# Patient Record
Sex: FEMALE | Race: OTHER | URBAN - METROPOLITAN AREA
[De-identification: names, ages, dates, MRNs, and addresses within clinical notes are randomized per-mention and may not be internally consistent; named-entity substitution may affect disease eponyms.]

---

## 2017-01-16 ENCOUNTER — OFFICE VISIT (OUTPATIENT)
Dept: INTERNAL MEDICINE CLINIC | Facility: CLINIC | Age: 62
End: 2017-01-16

## 2017-01-16 ENCOUNTER — TELEPHONE (OUTPATIENT)
Dept: INTERNAL MEDICINE CLINIC | Facility: CLINIC | Age: 62
End: 2017-01-16

## 2017-01-16 VITALS
HEART RATE: 86 BPM | OXYGEN SATURATION: 95 % | DIASTOLIC BLOOD PRESSURE: 70 MMHG | RESPIRATION RATE: 12 BRPM | HEIGHT: 63 IN | TEMPERATURE: 99 F | WEIGHT: 150 LBS | SYSTOLIC BLOOD PRESSURE: 129 MMHG | BODY MASS INDEX: 26.58 KG/M2

## 2017-01-16 DIAGNOSIS — J45.901 EXACERBATION OF ASTHMA: Primary | ICD-10-CM

## 2017-01-16 PROCEDURE — 99212 OFFICE O/P EST SF 10 MIN: CPT | Performed by: INTERNAL MEDICINE

## 2017-01-16 PROCEDURE — 99214 OFFICE O/P EST MOD 30 MIN: CPT | Performed by: INTERNAL MEDICINE

## 2017-01-16 RX ORDER — PREDNISONE 10 MG/1
TABLET ORAL
Qty: 15 TABLET | Refills: 0 | Status: SHIPPED | OUTPATIENT
Start: 2017-01-16

## 2017-01-16 RX ORDER — CHOLECALCIFEROL (VITAMIN D3) 125 MCG
2 CAPSULE ORAL DAILY
COMMUNITY

## 2017-01-16 RX ORDER — ALBUTEROL SULFATE 90 UG/1
2 AEROSOL, METERED RESPIRATORY (INHALATION) EVERY 6 HOURS PRN
Qty: 1 INHALER | Refills: 1 | Status: SHIPPED | OUTPATIENT
Start: 2017-01-16 | End: 2017-12-13

## 2017-01-16 RX ORDER — ATORVASTATIN CALCIUM 10 MG/1
TABLET, FILM COATED ORAL
Qty: 90 TABLET | Refills: 0 | Status: SHIPPED | OUTPATIENT
Start: 2017-01-16 | End: 2017-04-12

## 2017-01-16 NOTE — PROGRESS NOTES
HPI:    Patient ID: Christos Mckoy is a 63year old female. Cough  This is a new problem. The current episode started 1 to 4 weeks ago. The problem has been waxing and waning. The cough is productive of sputum and productive of purulent sputum.  Assoc appear ill. No distress. HENT:   Right Ear: External ear normal.   Left Ear: External ear normal.   Nose: Nose normal.   Mouth/Throat: Oropharynx is clear and moist. No oropharyngeal exudate.    Eyes: Conjunctivae are normal. Pupils are equal, round, and

## 2017-01-16 NOTE — TELEPHONE ENCOUNTER
Patient stated for 2 to 3 days has had a cough producing dark yellow phlegm, headache, stuffy nose, little chest congestion with wheezing due to her asthma. Stated has shortness of breath at time but is relieved with her inhaler.    Appointment made for to

## 2017-04-12 DIAGNOSIS — E78.00 PURE HYPERCHOLESTEROLEMIA: Primary | ICD-10-CM

## 2017-04-14 NOTE — TELEPHONE ENCOUNTER
LMTCB    Please transfer x [phone] Rodo Beckett RN) until 5pm today or [phone] anytime. Thank you.

## 2017-04-15 NOTE — TELEPHONE ENCOUNTER
Called and spoke with pt states she is still taking this medication. Hasn't has lipid panel since 2015 did you want us to order that?

## 2017-04-19 RX ORDER — ATORVASTATIN CALCIUM 10 MG/1
TABLET, FILM COATED ORAL
Qty: 90 TABLET | Refills: 0 | Status: SHIPPED | OUTPATIENT
Start: 2017-04-19 | End: 2018-01-22

## 2017-04-19 NOTE — TELEPHONE ENCOUNTER
Yes she needs to get lipid and cmp; this needs to be done since we need to monitor cholesterol and particularly her liver when taking chol med.

## 2017-07-28 ENCOUNTER — TELEPHONE (OUTPATIENT)
Dept: INTERNAL MEDICINE CLINIC | Facility: CLINIC | Age: 62
End: 2017-07-28

## 2017-07-28 NOTE — TELEPHONE ENCOUNTER
Actions Requested: office visit  Problem: sore throat/strep exposure  Onset and Timing: 3 days  Associated Symptoms: sore throat, earache, red swollen tonsils, cough  Aggravating by: n/a  Alleviated by: n/a  Triage Note: Patient states her 28year old son (especially chest and abdomen)    Care Advice Discussed:  * Reasons To Call Back      - You become worse

## 2017-07-29 ENCOUNTER — OFFICE VISIT (OUTPATIENT)
Dept: INTERNAL MEDICINE CLINIC | Facility: CLINIC | Age: 62
End: 2017-07-29

## 2017-07-29 VITALS
WEIGHT: 150 LBS | RESPIRATION RATE: 12 BRPM | BODY MASS INDEX: 26.58 KG/M2 | HEART RATE: 71 BPM | SYSTOLIC BLOOD PRESSURE: 100 MMHG | TEMPERATURE: 98 F | HEIGHT: 63 IN | DIASTOLIC BLOOD PRESSURE: 61 MMHG

## 2017-07-29 DIAGNOSIS — J02.9 SORE THROAT: Primary | ICD-10-CM

## 2017-07-29 LAB
CONTROL LINE PRESENT WITH A CLEAR BACKGROUND (YES/NO): YES YES/NO
KIT LOT #: NORMAL NUMERIC
STREP GRP A CUL-SCR: NEGATIVE

## 2017-07-29 PROCEDURE — 99214 OFFICE O/P EST MOD 30 MIN: CPT | Performed by: INTERNAL MEDICINE

## 2017-07-29 PROCEDURE — 99213 OFFICE O/P EST LOW 20 MIN: CPT | Performed by: INTERNAL MEDICINE

## 2017-07-29 PROCEDURE — 87880 STREP A ASSAY W/OPTIC: CPT | Performed by: INTERNAL MEDICINE

## 2017-07-29 NOTE — PROGRESS NOTES
HPI:    Patient ID: Linda Ho is a 63year old female. Sore Throat    This is a new problem. The current episode started in the past 7 days. The problem has been unchanged. There has been no fever.  Associated symptoms include coughing, ear pain exhibits no discharge. No scleral icterus. Neck: Normal range of motion. Neck supple. No thyromegaly present. Cardiovascular: Normal rate, regular rhythm and normal heart sounds. No murmur heard.   Pulmonary/Chest: Effort normal and breath sounds nor

## 2017-08-17 RX ORDER — ATORVASTATIN CALCIUM 10 MG/1
TABLET, FILM COATED ORAL
Qty: 90 TABLET | Refills: 0 | OUTPATIENT
Start: 2017-08-17

## 2017-08-17 NOTE — TELEPHONE ENCOUNTER
Failed protocol no recent labs  Cholesterol Medications  Protocol Criteria:  · Appointment scheduled in the past 12 months or in the next 3 months  · ALT & LDL on file in the past 12 months  · ALT result < 80  · LDL result <130   Recent Outpatient Visits

## 2017-12-05 NOTE — TELEPHONE ENCOUNTER
Pt would like refill on Rx Atorvastatin 10 MG.   Also updated pharmacy   Pt stts out of medication     Current Outpatient Prescriptions:  ATORVASTATIN CALCIUM 10 MG Oral Tab TAKE 1 TABLET BY MOUTH AT BEDTIME Disp: 90 tablet Rfl: 0

## 2017-12-05 NOTE — TELEPHONE ENCOUNTER
No recent labs  Last refilled on 4/19/17 #90 0RF Compliance? Has appt in 1 week.    Cholesterol Medications  Protocol Criteria:  · Appointment scheduled in the past 12 months or in the next 3 months  · ALT & LDL on file in the past 12 months  · ALT result

## 2017-12-07 RX ORDER — ATORVASTATIN CALCIUM 10 MG/1
10 TABLET, FILM COATED ORAL
Qty: 90 TABLET | Refills: 0 | OUTPATIENT
Start: 2017-12-07

## 2017-12-07 NOTE — TELEPHONE ENCOUNTER
Patient indicated that has an appointment with Dr Beal Brought on 12/13/17.      Refill refused: Have patient call the office (pt has had no lipid panel and cmp for 2 yrs; needs ov)

## 2017-12-13 ENCOUNTER — OFFICE VISIT (OUTPATIENT)
Dept: INTERNAL MEDICINE CLINIC | Facility: CLINIC | Age: 62
End: 2017-12-13

## 2017-12-13 VITALS
HEART RATE: 65 BPM | HEIGHT: 63 IN | WEIGHT: 145 LBS | RESPIRATION RATE: 12 BRPM | SYSTOLIC BLOOD PRESSURE: 113 MMHG | DIASTOLIC BLOOD PRESSURE: 66 MMHG | OXYGEN SATURATION: 97 % | BODY MASS INDEX: 25.69 KG/M2 | TEMPERATURE: 97 F

## 2017-12-13 DIAGNOSIS — J45.21 MILD INTERMITTENT ASTHMA WITH EXACERBATION: Primary | ICD-10-CM

## 2017-12-13 DIAGNOSIS — Z23 NEED FOR VACCINATION: ICD-10-CM

## 2017-12-13 DIAGNOSIS — E78.00 HYPERCHOLESTEREMIA: ICD-10-CM

## 2017-12-13 DIAGNOSIS — Z12.11 COLON CANCER SCREENING: ICD-10-CM

## 2017-12-13 PROCEDURE — 90686 IIV4 VACC NO PRSV 0.5 ML IM: CPT | Performed by: INTERNAL MEDICINE

## 2017-12-13 PROCEDURE — 90471 IMMUNIZATION ADMIN: CPT | Performed by: INTERNAL MEDICINE

## 2017-12-13 PROCEDURE — 99212 OFFICE O/P EST SF 10 MIN: CPT | Performed by: INTERNAL MEDICINE

## 2017-12-13 PROCEDURE — 99214 OFFICE O/P EST MOD 30 MIN: CPT | Performed by: INTERNAL MEDICINE

## 2017-12-13 RX ORDER — ALBUTEROL SULFATE 90 UG/1
2 AEROSOL, METERED RESPIRATORY (INHALATION) EVERY 6 HOURS PRN
Qty: 1 INHALER | Refills: 1 | Status: SHIPPED | OUTPATIENT
Start: 2017-12-13 | End: 2018-04-30

## 2017-12-13 NOTE — PROGRESS NOTES
HPI:    Patient ID: Wiley Blizzard is a 63year old female. Asthma   This is a chronic problem. The current episode started more than 1 month ago (2mos had been getting worse since moving to another home). The problem occurs constantly.  The problem h Allergies   PHYSICAL EXAM:   Physical Exam   Constitutional: She is oriented to person, place, and time. She appears well-developed and well-nourished. No distress. HENT:   Head: Normocephalic and atraumatic.    Right Ear: External ear normal.   Left Ear: before will refill her chol med. Ff low chol diet.     (Z23) Need for vaccination  Plan: FLULAVAL INFLUENZA VACCINE QUAD PRESERVATIVE         FREE 0.5 ML        Pt given flu shot today.    (Z12.11) Colon cancer screening  Plan: OCCULT BLOOD, FECAL, IMMUNOAS

## 2018-01-14 LAB
ALBUMIN/GLOBULIN RATIO: 1.3 (CALC) (ref 1–2.5)
ALBUMIN: 4.1 G/DL (ref 3.6–5.1)
ALKALINE PHOSPHATASE: 82 U/L (ref 33–130)
ALT: 26 U/L (ref 6–29)
AST: 16 U/L (ref 10–35)
BILIRUBIN, TOTAL: 0.3 MG/DL (ref 0.2–1.2)
BUN: 18 MG/DL (ref 7–25)
CALCIUM: 9.8 MG/DL (ref 8.6–10.4)
CARBON DIOXIDE: 23 MMOL/L (ref 20–31)
CHLORIDE: 106 MMOL/L (ref 98–110)
CHOL/HDLC RATIO: 5 (CALC)
CHOLESTEROL, TOTAL: 224 MG/DL
CREATININE: 0.86 MG/DL (ref 0.5–0.99)
EGFR IF AFRICN AM: 85 ML/MIN/1.73M2
EGFR IF NONAFRICN AM: 73 ML/MIN/1.73M2
GLOBULIN: 3.2 G/DL (CALC) (ref 1.9–3.7)
GLUCOSE: 97 MG/DL (ref 65–99)
HDL CHOLESTEROL: 45 MG/DL
LDL-CHOLESTEROL: 153 MG/DL (CALC)
NON-HDL CHOLESTEROL: 179 MG/DL (CALC)
POTASSIUM: 4.6 MMOL/L (ref 3.5–5.3)
PROTEIN, TOTAL: 7.3 G/DL (ref 6.1–8.1)
SODIUM: 139 MMOL/L (ref 135–146)
TRIGLYCERIDES: 132 MG/DL

## 2018-01-22 ENCOUNTER — TELEPHONE (OUTPATIENT)
Dept: INTERNAL MEDICINE CLINIC | Facility: CLINIC | Age: 63
End: 2018-01-22

## 2018-01-22 DIAGNOSIS — E78.5 ELEVATED LIPIDS: Primary | ICD-10-CM

## 2018-01-22 RX ORDER — ATORVASTATIN CALCIUM 10 MG/1
10 TABLET, FILM COATED ORAL NIGHTLY
Qty: 90 TABLET | Refills: 0 | Status: SHIPPED | OUTPATIENT
Start: 2018-01-22 | End: 2018-04-30

## 2018-05-01 RX ORDER — ATORVASTATIN CALCIUM 10 MG/1
TABLET, FILM COATED ORAL
Qty: 90 TABLET | Refills: 0 | Status: SHIPPED | OUTPATIENT
Start: 2018-05-01 | End: 2018-07-31

## 2018-07-31 RX ORDER — ATORVASTATIN CALCIUM 10 MG/1
TABLET, FILM COATED ORAL
Qty: 90 TABLET | Refills: 0 | Status: SHIPPED | OUTPATIENT
Start: 2018-07-31 | End: 2018-11-05

## 2018-11-05 RX ORDER — ATORVASTATIN CALCIUM 10 MG/1
TABLET, FILM COATED ORAL
Qty: 90 TABLET | Refills: 0 | Status: SHIPPED | OUTPATIENT
Start: 2018-11-05

## 2019-05-03 ENCOUNTER — HOSPITAL (OUTPATIENT)
Dept: OTHER | Age: 64
End: 2019-05-03
Attending: FAMILY MEDICINE

## 2019-11-08 DIAGNOSIS — E78.00 HYPERCHOLESTEREMIA: Primary | ICD-10-CM

## 2019-11-08 RX ORDER — ATORVASTATIN CALCIUM 10 MG/1
TABLET, FILM COATED ORAL
Qty: 90 TABLET | Refills: 0 | OUTPATIENT
Start: 2019-11-08

## 2019-11-08 NOTE — TELEPHONE ENCOUNTER
Does not pass protocol. Overdue for labs and appt. Called the patient. She states she has moved to [facility] and now has a new PCP. She will call her new PCP for refill. Refill refused per protocol, no longer under prescriber care.      Cholesterol Med

## 2020-03-24 RX ORDER — ATORVASTATIN CALCIUM 10 MG/1
TABLET, FILM COATED ORAL
Qty: 90 TABLET | Refills: 0 | OUTPATIENT
Start: 2020-03-24

## 2020-03-24 NOTE — TELEPHONE ENCOUNTER
Spoke with pt informed  Dr. Eric Cosme denied chol med as pt has not had labs done in 2 yrs. Pt stts she actually had wellness labs done with her employer via [employer] a few wks ago. I informed pt to have those labs faxed to us and pt agreed. Also informed pt she will need OV sometime since last visit was 2017. Pt voiced understanding. Will await lab results.

## 2022-09-30 ENCOUNTER — IMAGING SERVICES (OUTPATIENT)
Dept: OTHER | Age: 67
End: 2022-09-30

## 2023-07-13 PROBLEM — E78.00 PURE HYPERCHOLESTEROLEMIA: Status: ACTIVE | Noted: 2023-07-13

## 2023-07-13 PROBLEM — M81.8 OSTEOPOROSIS, IDIOPATHIC: Status: ACTIVE | Noted: 2023-07-13

## 2023-11-29 ENCOUNTER — IMAGING SERVICES (OUTPATIENT)
Dept: OTHER | Age: 68
End: 2023-11-29

## 2024-03-31 ENCOUNTER — WALK IN (OUTPATIENT)
Dept: URGENT CARE | Age: 69
End: 2024-03-31
Attending: FAMILY MEDICINE

## 2024-03-31 VITALS
HEART RATE: 76 BPM | OXYGEN SATURATION: 96 % | DIASTOLIC BLOOD PRESSURE: 78 MMHG | RESPIRATION RATE: 18 BRPM | TEMPERATURE: 97.7 F | SYSTOLIC BLOOD PRESSURE: 154 MMHG

## 2024-03-31 DIAGNOSIS — R22.0 SWELLING ASSOCIATED WITH DENTAL STRUCTURE: ICD-10-CM

## 2024-03-31 DIAGNOSIS — K08.89 PAIN, DENTAL: Primary | ICD-10-CM

## 2024-03-31 PROCEDURE — 99202 OFFICE O/P NEW SF 15 MIN: CPT

## 2024-03-31 RX ORDER — TRAMADOL HYDROCHLORIDE 50 MG/1
50 TABLET ORAL EVERY 8 HOURS PRN
Qty: 10 TABLET | Refills: 0 | Status: SHIPPED | OUTPATIENT
Start: 2024-03-31

## 2024-03-31 RX ORDER — AMOXICILLIN AND CLAVULANATE POTASSIUM 875; 125 MG/1; MG/1
1 TABLET, FILM COATED ORAL 2 TIMES DAILY
Qty: 14 TABLET | Refills: 0 | Status: SHIPPED | OUTPATIENT
Start: 2024-03-31 | End: 2024-04-07

## 2024-03-31 ASSESSMENT — PAIN SCALES - GENERAL: PAINLEVEL: 8

## 2024-05-24 PROBLEM — J45.909 ASTHMA (CMD): Status: ACTIVE | Noted: 2024-05-24

## 2024-08-08 PROBLEM — M81.0 OSTEOPOROSIS WITHOUT CURRENT PATHOLOGICAL FRACTURE: Status: ACTIVE | Noted: 2023-07-13

## 2024-12-04 ENCOUNTER — HOSPITAL ENCOUNTER (OUTPATIENT)
Dept: MAMMOGRAPHY | Age: 69
Discharge: HOME | End: 2024-12-04
Attending: FAMILY MEDICINE

## 2024-12-04 DIAGNOSIS — Z12.31 SCREENING MAMMOGRAM FOR BREAST CANCER: ICD-10-CM

## 2024-12-04 DIAGNOSIS — Z78.0 MENOPAUSE: ICD-10-CM

## 2024-12-04 PROCEDURE — 77080 DXA BONE DENSITY AXIAL: CPT

## 2024-12-04 PROCEDURE — 77067 SCR MAMMO BI INCL CAD: CPT

## 2024-12-06 LAB
DEXA LT FEMNECK TSCORE: -1.6
DEXA LT FEMNECK ZSCORE: 0
DEXA LT FOREARM T-SCORE: -2.7
DEXA LT FOREARM Z-SCORE: -0.9
DEXA LT TOTFEM TSCORE: -0.9
DEXA LT TOTFEM ZSCORE: 0.4
DEXA SPINE L1-L4 T-SCORE: -2.9
DEXA SPINE L1-L4 Z-SCORE: -1

## 2025-06-24 PROBLEM — R07.89 FEELING OF CHEST TIGHTNESS: Status: ACTIVE | Noted: 2025-06-24

## 2025-06-24 PROBLEM — K21.9 GASTROESOPHAGEAL REFLUX DISEASE: Status: ACTIVE | Noted: 2025-06-24

## 2025-06-24 PROBLEM — J98.4 PULMONARY SCARRING: Status: ACTIVE | Noted: 2025-06-24

## 2025-06-24 PROBLEM — J44.9 CHRONIC OBSTRUCTIVE PULMONARY DISEASE  (CMD): Status: ACTIVE | Noted: 2025-06-24

## 2025-06-24 PROBLEM — J45.40 MODERATE PERSISTENT ALLERGIC ASTHMA (CMD): Status: ACTIVE | Noted: 2025-06-24

## 2025-07-17 ENCOUNTER — HOSPITAL ENCOUNTER (OUTPATIENT)
Dept: CT IMAGING | Age: 70
Discharge: HOME | End: 2025-07-17
Attending: INTERNAL MEDICINE

## 2025-07-17 ENCOUNTER — HOSPITAL ENCOUNTER (OUTPATIENT)
Dept: GENERAL RADIOLOGY | Age: 70
Discharge: HOME | End: 2025-07-17
Attending: INTERNAL MEDICINE

## 2025-07-17 DIAGNOSIS — J98.4 PULMONARY SCARRING: ICD-10-CM

## 2025-07-17 DIAGNOSIS — K21.9 GASTROESOPHAGEAL REFLUX DISEASE, UNSPECIFIED WHETHER ESOPHAGITIS PRESENT: ICD-10-CM

## 2025-07-17 PROCEDURE — 74240 X-RAY XM UPR GI TRC 1CNTRST: CPT

## 2025-07-17 PROCEDURE — 10002805 HB CONTRAST AGENT: Performed by: INTERNAL MEDICINE

## 2025-07-17 PROCEDURE — 71250 CT THORAX DX C-: CPT

## 2025-07-17 RX ADMIN — BARIUM SULFATE 100 ML: 980 POWDER, FOR SUSPENSION ORAL at 09:46

## 2025-07-17 RX ADMIN — BARIUM SULFATE 700 MG: 700 TABLET ORAL at 09:46

## (undated) NOTE — MR AVS SNAPSHOT
[address], [address]  [address] [facility]  [phone]               Thank you for choosing us for your health care visit with Claudene Newborn, MD.  We are glad to serve you and happy to provide you with this s medications prescribed for you. Read the directions carefully, and ask your doctor or other care provider to review them with you.          Where to Get Your Medications      These medications were sent to [facility] #[id number]- [place], [place] - [address] [facility] [address] Start activities slowly and build up over time Do what you like   Get your heart pumping – brisk walking, biking, swimming Even 10 minute increments are effective and add up over the week   2 ½ hours per week – spread out over time Use a raven to keep you

## (undated) NOTE — LETTER
07/26/18        [address]       Dear Jim Adams,    [address] [facility] records indicate that you have outstanding lab work and or testing that was ordered for you and has not yet been completed:          Lipid Panel [E